# Patient Record
(demographics unavailable — no encounter records)

---

## 2025-06-03 NOTE — PLAN
[FreeTextEntry1] : 30 y/o G0 female presents for annual exam.  -HPV/Pap performed today -Refill OCP -STI screen  -RTO 1 year for annual

## 2025-06-03 NOTE — END OF VISIT
[FreeTextEntry3] : I, Lupe May, acted as a scribe on behalf of Dr. Lupe Ventura M.D. on 06/03/2025 .   All medical entries made by the scribe were at my Dr. Lupe Ventura M.D direction and personally dictated by me on  06/03/2025 . I have reviewed the chart and agree that the record accurately reflects my personal performance of the history, physical exam, assessment and plan. I have also personally directed, reviewed, and agreed with the chart.

## 2025-06-03 NOTE — HISTORY OF PRESENT ILLNESS
[FreeTextEntry1] : 32 y/o G0 female presents for annual exam. Pt takes OCP for contraception. H/o of abnormal pap in 2020- normal colposcopy. She received the HPV vaccine. Pt reports h/o endometriosis diagnosed by laparoscopy in 2014, currently takes extended cycle .  She was diagnosed last month with DM type 2 and was hospitalized at [facility] with DKA.   PMHx: DM2, Borderline Personality Disorder, Depression, Endometriosis PSHx: Laparoscopy Medication: Insulin, Metformin, Latuda FamHx: MAunt Breast Ca  Pt works as a  for [employer]